# Patient Record
Sex: MALE | Race: WHITE
[De-identification: names, ages, dates, MRNs, and addresses within clinical notes are randomized per-mention and may not be internally consistent; named-entity substitution may affect disease eponyms.]

---

## 2019-07-11 PROBLEM — Z00.00 ENCOUNTER FOR PREVENTIVE HEALTH EXAMINATION: Status: ACTIVE | Noted: 2019-07-11

## 2019-08-08 ENCOUNTER — APPOINTMENT (OUTPATIENT)
Dept: VASCULAR SURGERY | Facility: CLINIC | Age: 69
End: 2019-08-08
Payer: MEDICARE

## 2019-08-08 VITALS
HEART RATE: 67 BPM | BODY MASS INDEX: 25.9 KG/M2 | DIASTOLIC BLOOD PRESSURE: 80 MMHG | WEIGHT: 165 LBS | HEIGHT: 67 IN | TEMPERATURE: 98.7 F | SYSTOLIC BLOOD PRESSURE: 125 MMHG

## 2019-08-08 VITALS — DIASTOLIC BLOOD PRESSURE: 87 MMHG | SYSTOLIC BLOOD PRESSURE: 127 MMHG | HEART RATE: 67 BPM

## 2019-08-08 PROCEDURE — 99203 OFFICE O/P NEW LOW 30 MIN: CPT

## 2019-08-08 PROCEDURE — 93970 EXTREMITY STUDY: CPT

## 2019-08-23 ENCOUNTER — OTHER (OUTPATIENT)
Age: 69
End: 2019-08-23

## 2019-08-23 RX ORDER — SODIUM BICARBONATE 84 MG/ML
8.4 INJECTION, SOLUTION INTRAVENOUS
Qty: 5 | Refills: 0 | Status: COMPLETED | COMMUNITY
Start: 2019-08-23 | End: 2019-08-30

## 2019-08-23 RX ORDER — LIDOCAINE HYDROCHLORIDE 10 MG/ML
1 INJECTION, SOLUTION INFILTRATION; PERINEURAL
Qty: 50 | Refills: 0 | Status: COMPLETED | COMMUNITY
Start: 2019-08-23 | End: 2019-08-30

## 2019-08-23 RX ORDER — SODIUM CHLORIDE 9 G/ML
0.9 INJECTION, SOLUTION INTRAVENOUS
Qty: 500 | Refills: 0 | Status: COMPLETED | COMMUNITY
Start: 2019-08-23 | End: 2019-08-30

## 2019-08-30 ENCOUNTER — APPOINTMENT (OUTPATIENT)
Dept: VASCULAR SURGERY | Facility: CLINIC | Age: 69
End: 2019-08-30
Payer: MEDICARE

## 2019-08-30 ENCOUNTER — OTHER (OUTPATIENT)
Age: 69
End: 2019-08-30

## 2019-08-30 PROCEDURE — 36475 ENDOVENOUS RF 1ST VEIN: CPT | Mod: RT

## 2019-08-30 RX ORDER — LIDOCAINE HYDROCHLORIDE 10 MG/ML
1 INJECTION, SOLUTION INFILTRATION; PERINEURAL
Qty: 50 | Refills: 0 | Status: COMPLETED | COMMUNITY
Start: 2019-08-30 | End: 2019-09-06

## 2019-08-30 RX ORDER — SODIUM CHLORIDE 9 G/ML
0.9 INJECTION, SOLUTION INTRAVENOUS
Qty: 500 | Refills: 0 | Status: COMPLETED | COMMUNITY
Start: 2019-08-30 | End: 2019-09-06

## 2019-08-30 RX ORDER — SODIUM BICARBONATE 84 MG/ML
8.4 INJECTION, SOLUTION INTRAVENOUS
Qty: 5 | Refills: 0 | Status: COMPLETED | COMMUNITY
Start: 2019-08-30 | End: 2019-09-06

## 2019-08-30 NOTE — PROCEDURE
[FreeTextEntry1] : right GSV RFA [FreeTextEntry3] : Procedural safety checklist and time out completed:\par Confirmed patient identification (Patient Name, , and/or medical record number including when possible affirmation by patient or parent/family/other).\par Confirmed procedure with the patient. Consent present, accurate and signed. \par Confirmed special equipment and supplies are present.\par Sterility confirmed. Position verified. \par Site/ side is marked and visible and confirmed. \par Procedure confirmed by consent. Accurate consent including side and site.\par Review of medical records, including venous ultrasound, noting correct procedure including site and side.\par MD/PA verifies presence and review of imaging studies and or written report of imaging studies.\par Agreement on the procedure to be performed\par Time out completed.\par All of the above has been confirmed by the team.\par All patient-specific concerns have been addressed. \par \par Indication: Right lower extremity varicose veins with leg pain, leg swelling, and leg cramping.  Venous insufficiency/ reflux.\par \par Procedure: radiofrequency ablation of the right great saphenous vein. \par 	\par Mr. ALLEN FRANKEL is a 68 year old M with a history of  right lower extremity varicose veins previously seen in the office.  Ultrasound examination demonstrated venous insufficiency. A trial of compression stockings, exercise, elevation, and pain medication was attempted without relief and definitive treatment with radiofrequency ablation was offered. \par \par The patient has come for radiofrequency ablation treatment of the right great saphenous vein. \par I have discussed the risks of the procedure at length with the patient. The risks discussed were inclusive of but not limited to infection, irritation at the site of infiltration of local anesthesia, and also rare risk of deep venous thrombosis and pulmonary emboli. The patient agrees to proceed with the procedure. \par The patient was escorted into the procedure room and a time out called.\par The entire limb was prepped and draped in sterile fashion. The RF fiber was placed on the sterile field and connected by a sterile cable. Actuation, temperature, and impedance testing were performed to ensure that all components were connected and operating properly. \par The patient was placed on the procedure table and local anesthesia was instilled in the skin overlying the access site. Under ultrasound guidance, the vein was punctured with a micropuncture needle, using the anterior wall technique. A guide wire was now introduced through the needle, and the needle was then exchanged over the guide wire for a 7F sheath. The guide wire was removed and the RF probe was then placed into the right great saphenous vein through the sheath and position confirmed using ultrasound guidance. After the RF probe position was verified by ultrasound, tumescent anesthesia consisting of normal saline, 1% lidocaine with 8.4% sodium bicarbonate was infiltrated, under ultrasound guidance, precisely into the perivenous compartment along the entire length of the vein until a “halo” of fluid was noted around the vein. After RF probe position was again confirmed with ultrasound imaging, RF energy was applied. The probe was gradually and carefully withdrawn at a rate of 6.5cm/20seconds. \par \par The total volume injected was 450 cc. \par Total treatment time was 160 seconds.\par Treatment length was 46 cm and 8 cycles of RF performed using the 7 cm probe. \par The probe is 3.7 cm from the SFJ.\par \par Estimated Blood Loss: minimal\par Repeat ultrasound of the treated vein was performed confirming successful treatment. The catheter and sheath were withdrawn and hemostasis established with direct pressure. After assuring hemostasis, a sterile 4x4 was placed on the access site and an ACE compression wrap was applied. Patient tolerated procedure well. Patient was given post-procedure instructions and follow up appointment was scheduled.\par \par \par

## 2019-09-06 ENCOUNTER — APPOINTMENT (OUTPATIENT)
Dept: VASCULAR SURGERY | Facility: CLINIC | Age: 69
End: 2019-09-06
Payer: MEDICARE

## 2019-09-06 ENCOUNTER — APPOINTMENT (OUTPATIENT)
Dept: VASCULAR SURGERY | Facility: CLINIC | Age: 69
End: 2019-09-06

## 2019-09-06 DIAGNOSIS — Z87.891 PERSONAL HISTORY OF NICOTINE DEPENDENCE: ICD-10-CM

## 2019-09-06 DIAGNOSIS — Z80.8 FAMILY HISTORY OF MALIGNANT NEOPLASM OF OTHER ORGANS OR SYSTEMS: ICD-10-CM

## 2019-09-06 DIAGNOSIS — Z87.39 PERSONAL HISTORY OF OTHER DISEASES OF THE MUSCULOSKELETAL SYSTEM AND CONNECTIVE TISSUE: ICD-10-CM

## 2019-09-06 PROCEDURE — 93971 EXTREMITY STUDY: CPT

## 2019-09-06 PROCEDURE — 99212 OFFICE O/P EST SF 10 MIN: CPT

## 2019-09-06 RX ORDER — ALPRAZOLAM 0.5 MG/1
0.5 TABLET ORAL
Qty: 5 | Refills: 0 | Status: DISCONTINUED | COMMUNITY
Start: 2019-08-23 | End: 2019-09-06

## 2019-09-06 NOTE — HISTORY OF PRESENT ILLNESS
[FreeTextEntry1] : Mr. ALLEN FRANKEL is a 68 year who presents to the office for evaluation of his varicose veins and venous insufficiency. Patient presents for radiofrequency ablation of the right small saphenous vein. Patient is s/p radiofrequency ablation of the right great saphenous vein  right great saphenous vein on a week ago 8/30/2019. Patient is doing well without complaints. Patient reports a decrease in leg swelling and foot pain, no fever or chills and no bruising or parasthesias. No SOB or CP.\par

## 2019-09-06 NOTE — DATA REVIEWED
[FreeTextEntry1] : Post procedure venous duplex done today demonstrated successful closure of the right great saphenous vein with and EHIT 2. It was also noted that the right small saphenous vein was thrombosed as well as the tributaries in his right thigh.\par

## 2019-09-06 NOTE — ASSESSMENT
[FreeTextEntry1] : Mr. ALLEN FRANKEL is a 68 year who presents to the office for evaluation of his varicose veins and venous insufficiency. Patient presents for radiofrequency ablation of the right small saphenous vein. Patient is s/p radiofrequency ablation of the right great saphenous vein  right great saphenous vein on a week ago 8/30/2019. Patient is doing well without complaints. Patient reports a decrease in leg swelling and foot pain, no fever or chills and no bruising or parasthesias. No SOB or CP.\par \par Post procedure venous duplex done today demonstrated successful closure of the right great saphenous vein with and EHIT 2. It was also noted that the right small saphenous vein was thrombosed as well as the tributaries in his right thigh.\par \par A/P: Patient with symptomatic varicose veins and venous insufficiency. Patient is s/p R GSV RFA with an EHIT 2. R SSV RFA aborted due to the SSV was found to thrombosed on venous duplex done today. Findings were discussed with the patient and it was noted that the patient has been taking several OTC supplements (more than 5) to "thin his blood". The patient was advised to take Xarelto 15 mg BID for one week and return next week for a repeat venous duplex and possibly to continue with his treatment plan of a left GSV RFA. Patient verbalized an understanding.

## 2019-09-06 NOTE — REVIEW OF SYSTEMS
[Fever] : no fever [Chills] : no chills [Nosebleeds] : no nosebleeds [Limb Pain] : limb pain [Limb Swelling] : limb swelling [Skin Wound] : skin wound [Dry Skin] : dry skin [Difficulty Walking] : no difficulty walking [Easy Bleeding] : no tendency for easy bleeding [Easy Bruising] : no tendency for easy bruising

## 2019-09-06 NOTE — PHYSICAL EXAM
[JVD] : no jugular venous distention  [2+] : left 2+ [Ankle Swelling (On Exam)] : present [] : present [Varicose Veins Of Lower Extremities] : present [Alert] : alert [Skin Ulcer] : ulcer [Oriented to Place] : oriented to place [Oriented to Person] : oriented to person [Calm] : calm [Oriented to Time] : oriented to time [de-identified] : NC/AT [de-identified] : unlabored respirations [de-identified] : well appearing male in NAD [de-identified] : right medial thigh tributary veins are thrombosed with induration and non-tender [de-identified] : reg rhythm [de-identified] : cooperative and pleasant

## 2019-09-09 ENCOUNTER — OTHER (OUTPATIENT)
Age: 69
End: 2019-09-09

## 2019-09-09 RX ORDER — SODIUM CHLORIDE 9 G/ML
0.9 INJECTION, SOLUTION INTRAVENOUS
Qty: 500 | Refills: 0 | Status: COMPLETED | COMMUNITY
Start: 2019-09-09 | End: 2019-09-13

## 2019-09-09 RX ORDER — SODIUM BICARBONATE 84 MG/ML
8.4 INJECTION, SOLUTION INTRAVENOUS
Qty: 5 | Refills: 0 | Status: COMPLETED | COMMUNITY
Start: 2019-09-09 | End: 2019-09-13

## 2019-09-13 ENCOUNTER — APPOINTMENT (OUTPATIENT)
Dept: VASCULAR SURGERY | Facility: CLINIC | Age: 69
End: 2019-09-13
Payer: MEDICARE

## 2019-09-13 PROCEDURE — 93971 EXTREMITY STUDY: CPT | Mod: 59

## 2019-09-13 PROCEDURE — 36475 ENDOVENOUS RF 1ST VEIN: CPT | Mod: LT

## 2019-09-13 NOTE — PROCEDURE
[FreeTextEntry1] : Left GSV RFA [FreeTextEntry3] : Patient doing well s/p radiofrequency ablation of the right great saphenous vein 2019 followed by an EHIT 2 on Xarelto for one week. Right SSV also thrombosed after the right GSV ablation. Venous ultrasound done today demonstrates successful closure of the right great saphenous vein with regression of the EHIT 2 to an EHIT 1. Patient presents for left GSV RFA today.\par  \par Procedural safety checklist and time out completed:\par Confirmed patient identification (Patient Name, , and/or medical record number including when possible affirmation by patient or parent/family/other.\par Confirmed procedure with the patient. Consent present, accurate and signed. \par Confirmed special equipment and supplies are present.\par Sterility confirmed. Position verified. \par Site/ side is marked and visible and confirmed. \par Procedure confirmed by consent. Accurate consent including side and site.\par Review of medical records noting correct procedure including site and side.\par MD/PA verifies presence and review of imaging studies and or written report of imaging studies.\par Specify equipment are available for the planned procedure.\par MD/PA has marked the patient's procedural site and side.\par Agreement on the procedure to be performed\par Time out completed.\par All of the above has been confirmed by the team.\par All patient-specific concerns have been addressed\par \par Indication: Left lower extremity varicose veins with leg pain, leg swelling, and leg cramping.  Venous insufficiency/ reflux.\par \par Procedure: radiofrequency ablation of the left great saphenous vein. \par 	\par [Mr. ALLEN FRANKEL is a 68 year old M with a history of left lower extremity varicose veins previously seen in the office.  Ultrasound examination demonstrated venous insufficiency. A trial of compression stockings, exercise, elevation, and pain medication was attempted without relief and definitive treatment with radiofrequency ablation was offered. \par \par The patient has come for radiofrequency ablation treatment of the left great saphenous vein. \par I have discussed the risks of the procedure at length with the patient. The risks discussed were inclusive of but not limited to infection, irritation at the site of infiltration of local anesthesia, and also rare risk of deep venous thrombosis and pulmonary emboli. The patient agrees to proceed with the procedure. \par The patient was escorted into the procedure room and a time out called.\par The entire limb was prepped and draped in sterile fashion. The RF fiber was placed on the sterile field and connected by a sterile cable. Actuation, temperature, and impedance testing were performed to ensure that all components were connected and operating properly. \par The patient was placed on the procedure table and local anesthesia was instilled in the skin overlying the access site. Under ultrasound guidance, the vein was punctured with a micropuncture needle, using the anterior wall technique. A guide wire was now introduced through the needle, and the needle was then exchanged over the guide wire for a 7F sheath. The guide wire was removed and the RF probe was then placed into the left great saphenous vein through the sheath and position confirmed using ultrasound guidance. After the RF probe position was verified by ultrasound, tumescent anesthesia consisting of normal saline, 1% lidocaine with 8.4% sodium bicarbonate was infiltrated, under ultrasound guidance, precisely into the perivenous compartment along the entire length of the vein until a “halo” of fluid was noted around the vein. After RF probe position was again confirmed with ultrasound imaging, RF energy was applied. The probe was gradually and carefully withdrawn at a rate of 6.5cm/20seconds. \par \par The total volume injected was ___________cc. \par Total treatment time was _____seconds.\par Treatment length was ____ cm and ____cycles of RF performed using the ______ cm probe. \par The probe is ___cm from the SFJ. \par \par Estimated Blood Loss: minimal\par Repeat ultrasound of the treated vein was performed confirming successful treatment. The catheter and sheath were withdrawn and hemostasis established with direct pressure. After assuring hemostasis, a sterile 4x4 was placed on the access site and an ACE compression wrap was applied. Patient tolerated procedure well. Patient was given post-procedure instructions and follow up appointment was scheduled.\par \par

## 2019-09-20 ENCOUNTER — APPOINTMENT (OUTPATIENT)
Dept: VASCULAR SURGERY | Facility: CLINIC | Age: 69
End: 2019-09-20
Payer: MEDICARE

## 2019-09-20 PROCEDURE — 93970 EXTREMITY STUDY: CPT

## 2019-09-27 ENCOUNTER — APPOINTMENT (OUTPATIENT)
Dept: VASCULAR SURGERY | Facility: CLINIC | Age: 69
End: 2019-09-27

## 2019-10-04 ENCOUNTER — OTHER (OUTPATIENT)
Age: 69
End: 2019-10-04

## 2019-10-04 RX ORDER — LIDOCAINE HYDROCHLORIDE 10 MG/ML
1 INJECTION, SOLUTION INFILTRATION; PERINEURAL
Qty: 50 | Refills: 0 | Status: COMPLETED | COMMUNITY
Start: 2019-09-09 | End: 2019-10-04

## 2019-10-04 RX ORDER — SODIUM CHLORIDE 9 G/ML
0.9 INJECTION, SOLUTION INTRAVENOUS
Qty: 500 | Refills: 0 | Status: COMPLETED | COMMUNITY
Start: 2019-10-04 | End: 2019-10-11

## 2019-10-04 RX ORDER — LIDOCAINE HYDROCHLORIDE 10 MG/ML
1 INJECTION, SOLUTION INFILTRATION; PERINEURAL
Qty: 50 | Refills: 0 | Status: COMPLETED | COMMUNITY
Start: 2019-10-04 | End: 2019-10-11

## 2019-10-04 RX ORDER — SODIUM BICARBONATE 84 MG/ML
8.4 INJECTION, SOLUTION INTRAVENOUS
Qty: 5 | Refills: 0 | Status: COMPLETED | COMMUNITY
Start: 2019-10-04 | End: 2019-10-11

## 2019-10-11 ENCOUNTER — APPOINTMENT (OUTPATIENT)
Dept: VASCULAR SURGERY | Facility: CLINIC | Age: 69
End: 2019-10-11
Payer: MEDICARE

## 2019-10-11 PROCEDURE — 93971 EXTREMITY STUDY: CPT

## 2019-10-11 PROCEDURE — 37765 STAB PHLEB VEINS XTR 10-20: CPT | Mod: RT

## 2019-10-11 RX ORDER — RIVAROXABAN 15 MG-20MG
15 & 20 KIT ORAL
Qty: 1 | Refills: 0 | Status: COMPLETED | COMMUNITY
Start: 2019-09-06 | End: 2019-10-11

## 2019-10-11 NOTE — PROCEDURE
[FreeTextEntry1] : right stab phlebectomy [FreeTextEntry3] : Patient presents today for radiofrequency ablation of his left small saphenous vein. However, on pre-procedure venous ultrasound it was noted that the left SSV thrombosed spontaneously and there was no DVT. Therefore the ablation procedure was aborted and we proceeded to perform a right stab phlebectomy.\par \par Procedural safety checklist and time out completed:\par Confirmed patient identification (Patient Name, , and/or medical record number including when possible affirmation by patient or parent/family/other.\par Confirmed procedure with the patient. Consent present, accurate and signed. \par Confirmed special equipment and supplies are present.\par Sterility confirmed. Position verified. \par Site/ side is marked and visible and confirmed. \par Procedure confirmed by consent. Accurate consent including side and site.\par Review of medical records noting correct procedure including site and side.\par MD/PA verifies presence and review of imaging studies and or written report of imaging studies.\par Specify equipment are available for the planned procedure.\par MD/PA has marked the patient's procedural site and side.\par Agreement on the procedure to be performed\par Time out completed.\par All of the above has been confirmed by the team.\par All patient-specific concerns have been addressed. \par \par Indication: right lower extremity varicose veins with inflammation, leg pain, leg swelling, and leg cramping.  \par \par Procedure: Stab phlebectomy right lower extremity\par \par  Mr. ALLEN FRANKEL is a 68 year old M with a history of symptomatic right lower extremity varicose veins. A trial of compression stockings, exercise, elevation, and pain medication was attempted without relief and definitive treatment with microphlebectomy was offered. \par \par I have discussed the risks of the procedure at length with the patient. The risks discussed were inclusive of but not limited to infection, irritation at the site of infiltration of local anesthesia, and also rare risk of deep venous thrombosis and pulmonary emboli. The patient agrees to proceed with the procedure. \par The patient was escorted into the procedure room, the varicose veins for treatment were marked out and the patient placed on the examination table. The entire limb was prepped and draped in sterile fashion and a  time out was called. \par \par Local anesthesia using 30 cc 1% lidocaine with epinephrine was infiltrated using a 25 gauge needle over the previously marked prominent varicose vein sites.\par Multiple small stab incisions, each less than 1 cm in length was made at the noted sites. With the help of a vein hook, the vein was fished out at each of these sites, rolled over a narrow-tipped mosquito clamp and removed. Hemostasis was secured with leg elevation and application of manual pressure. After assuring hemostasis, a sterile 4x4 was placed on the access sites and an ACE compression wrap was applied. Estimated blood loss: minimal. Patient tolerated procedure well. Patient was given post-procedure instructions and follow up appointment was scheduled. \par \par SIDE - RIGHT\par SITE - CALF / THIGH\par LOCATION - PROXIMAL / DISTAL / MEDIAL / ANTERIOR \par \par Total Stab Incisions 10-20\par \par

## 2019-10-14 ENCOUNTER — APPOINTMENT (OUTPATIENT)
Dept: VASCULAR SURGERY | Facility: CLINIC | Age: 69
End: 2019-10-14

## 2019-11-01 ENCOUNTER — APPOINTMENT (OUTPATIENT)
Dept: VASCULAR SURGERY | Facility: CLINIC | Age: 69
End: 2019-11-01

## 2019-11-08 ENCOUNTER — APPOINTMENT (OUTPATIENT)
Dept: VASCULAR SURGERY | Facility: CLINIC | Age: 69
End: 2019-11-08
Payer: MEDICARE

## 2019-11-08 VITALS — HEART RATE: 54 BPM | SYSTOLIC BLOOD PRESSURE: 124 MMHG | DIASTOLIC BLOOD PRESSURE: 82 MMHG

## 2019-11-08 VITALS
WEIGHT: 165 LBS | TEMPERATURE: 98.4 F | SYSTOLIC BLOOD PRESSURE: 126 MMHG | HEIGHT: 67 IN | HEART RATE: 55 BPM | BODY MASS INDEX: 25.9 KG/M2 | DIASTOLIC BLOOD PRESSURE: 85 MMHG

## 2019-11-08 PROCEDURE — 99024 POSTOP FOLLOW-UP VISIT: CPT

## 2019-11-08 NOTE — REVIEW OF SYSTEMS
[Chest Pain] : no chest pain [Leg Claudication] : no intermittent leg claudication [Lower Ext Edema] : no extremity edema [Shortness Of Breath] : no shortness of breath [Abdominal Pain] : no abdominal pain [Skin Wound] : no skin wound [Difficulty Walking] : no difficulty walking [Limb Weakness] : no limb weakness [Negative] : Constitutional [FreeTextEntry9] : R foot numbness

## 2019-11-08 NOTE — ASSESSMENT
[FreeTextEntry1] : ALLEN FRANKEL is a 68 year male who presents today for follow up visit s/p bilateral GSV ablations and RLE stab phlebectomy. Of note, he was found to have EHIT 2 s/p R GSV RFA (performed 8/30/19) - the R SSV was noted to be thrombosed as well. R SSV ablation was canceled due to this finding. Patient was treated with Xarelto. Subsequent VLEs have been negative for DVT and show that bilateral GSVs have been successfully ablated. \par \par Today the patient reports an improvement in his symptoms including pain, discomfort, swelling, and discoloration. Denies fever, chills, claudication, rest pain and tissue loss. \par \par On exam, legs are warm and well perfused. Stab phlebectomy and RFA incisions sites are well healed. Bilateral LE hyperpigmentation and dry skin noted.\par \par Patient is s/p RFA and stab phlebectomy procedures c/b EHIT and successfully treated with short course of oral anticoagulation. Now reports symptomatic improvement but concerned about R foot numbness. Recommend neurology/spine referral. Otherwise follow up as needed.

## 2019-11-08 NOTE — PHYSICAL EXAM
[No Rash or Lesion] : No rash or lesion [2+] : left 2+ [Skin Ulcer] : no ulcer [Alert] : alert [Oriented to Person] : oriented to person [Oriented to Place] : oriented to place [Oriented to Time] : oriented to time [de-identified] : well appearing, NAD [Calm] : calm [de-identified] : unlabored [FreeTextEntry1] : well healed RLE stab phlebectomy and bilateral RFA incisions\par bilateral LE hyperpigmentation R>L\par dry flaky skin

## 2019-11-08 NOTE — HISTORY OF PRESENT ILLNESS
[FreeTextEntry1] : ALLEN FRANKEL is a 68 year male who presents today for follow up visit s/p bilateral GSV ablations and RLE stab phlebectomy. Of note, he was found to have EHIT 2 s/p R GSV RFA (performed 8/30/19) - the R SSV was noted to be thrombosed as well. R SSV ablation was canceled due to this finding. Patient was treated with Xarelto. Subsequent VLEs have been negative for DVT and show that bilateral GSVs have been successfully ablated. \par \par Today the patient reports an improvement in his symptoms including pain, discomfort, swelling, and discoloration. Denies fever, chills, claudication, rest pain and tissue loss.

## 2020-02-14 ENCOUNTER — APPOINTMENT (OUTPATIENT)
Dept: VASCULAR SURGERY | Facility: CLINIC | Age: 70
End: 2020-02-14
Payer: MEDICARE

## 2020-02-14 VITALS — SYSTOLIC BLOOD PRESSURE: 128 MMHG | HEART RATE: 64 BPM | DIASTOLIC BLOOD PRESSURE: 86 MMHG

## 2020-02-14 VITALS
BODY MASS INDEX: 25.9 KG/M2 | HEART RATE: 65 BPM | DIASTOLIC BLOOD PRESSURE: 85 MMHG | TEMPERATURE: 98.4 F | SYSTOLIC BLOOD PRESSURE: 129 MMHG | WEIGHT: 165 LBS | HEIGHT: 67 IN

## 2020-02-14 PROCEDURE — 99212 OFFICE O/P EST SF 10 MIN: CPT

## 2020-02-19 NOTE — REVIEW OF SYSTEMS
[Chest Pain] : no chest pain [Leg Claudication] : no intermittent leg claudication [Shortness Of Breath] : no shortness of breath [Abdominal Pain] : no abdominal pain [Skin Lesions] : no skin lesions [Skin Wound] : no skin wound [Negative] : Constitutional [FreeTextEntry9] : bilateral LE weakness/tiredness [de-identified] : R plantar numbness

## 2020-02-19 NOTE — ASSESSMENT
[FreeTextEntry1] : ALLEN FRANKEL is a 69 year old male who presents today for leg discomfort. Reports having weakness and tiredness in bilateral LE (worse on the R) since endovenous procedures in 2019. Continues to experience R plantar numbness, however has not been evaluated by neurology/spine specialists. LE discomfort worse in the morning and with prolonged sitting. Otherwise denies claudication, rest pain, tissue loss, walking difficulty. \par \par He is s/p R GSV RFA (8/2019), L GSV RFA (9/2019), RLE stab phlebectomy (10/2019). R GSV RFA complicated by EHIT 2 and treated with Xarelto. Subsequent VLEs negative for acute DVT and demonstrated successful closure of bilateral GSVs. \par \par On exam, legs are warm and well perfused. Normal LE pulses exam. Well healed RLE stab phlebectomy incisions. Bilateral LE venous stasis changes worse on the right. Equal LE strength bilaterally. \par \par Unlikely symptoms vasculogenic or related to venous procedures. Patient again advised to follow up with spine specialist and neurology for radiculopathy/neuropathy. Vascular follow up as needed.

## 2020-02-19 NOTE — HISTORY OF PRESENT ILLNESS
[FreeTextEntry1] : ALLEN FRANKEL is a 69 year old male who presents today for leg discomfort. Reports having weakness and tiredness in bilateral LE (worse on the R) since endovenous procedures in 2019. Continues to experience R plantar numbness, however has not been evaluated by neurology/spine specialists. LE discomfort worse in the morning and with prolonged sitting. Otherwise denies claudication, rest pain, tissue loss, walking difficulty. \par \par He is s/p R GSV RFA (8/2019), L GSV RFA (9/2019), RLE stab phlebectomy (10/2019). R GSV RFA complicated by EHIT 2 and treated with Xarelto. Subsequent VLEs negative for acute DVT and demonstrated successful closure of bilateral GSVs.

## 2020-02-19 NOTE — PHYSICAL EXAM
[2+] : left 2+ [No Rash or Lesion] : No rash or lesion [Skin Ulcer] : no ulcer [Alert] : alert [Oriented to Person] : oriented to person [Oriented to Place] : oriented to place [Oriented to Time] : oriented to time [Calm] : calm [de-identified] : well appearing, NAD [FreeTextEntry1] : well healed RLE stab phlebectomy \par bilateral LE hyperpigmentation R>L\par dry flaky skin [de-identified] : unlabored [de-identified] : equal LE muscle strength

## 2022-05-12 ENCOUNTER — APPOINTMENT (OUTPATIENT)
Dept: VASCULAR SURGERY | Facility: CLINIC | Age: 72
End: 2022-05-12
Payer: MEDICARE

## 2022-05-12 VITALS
DIASTOLIC BLOOD PRESSURE: 81 MMHG | WEIGHT: 168 LBS | TEMPERATURE: 95.7 F | HEART RATE: 66 BPM | SYSTOLIC BLOOD PRESSURE: 118 MMHG | BODY MASS INDEX: 26.37 KG/M2 | HEIGHT: 67 IN

## 2022-05-12 VITALS — DIASTOLIC BLOOD PRESSURE: 78 MMHG | SYSTOLIC BLOOD PRESSURE: 114 MMHG | HEART RATE: 66 BPM

## 2022-05-12 PROCEDURE — 93971 EXTREMITY STUDY: CPT

## 2022-05-12 PROCEDURE — 99213 OFFICE O/P EST LOW 20 MIN: CPT

## 2022-05-16 NOTE — ASSESSMENT
[FreeTextEntry1] : A/P: \par \par Patient with longstanding history of symptomatic venous insufficiency. He is s/p multiple vein procedures and now presents with recurrence of painful RLE varicose veins. Symptoms persist despite conservative measures. Treatment is indicated for symptomatic relief. \par \par Recommend RLE venasure ablation for refluxing segment of the GSV in the calf. He will likely need RLE stab phlebectomy to follow ablation procedure. R/B/A discussed with the patient. All questions/concerns addressed. Patient relayed understanding. He wants to discuss treatment plan with his podiatrist Dr. Aleman first and then will let us know whether or not he wants to proceed with proposed vein procedures.

## 2022-05-16 NOTE — PHYSICAL EXAM
[2+] : left 2+ [Alert] : alert [Oriented to Person] : oriented to person [Oriented to Place] : oriented to place [Oriented to Time] : oriented to time [Calm] : calm [Skin Ulcer] : no ulcer [de-identified] : well appearing, NAD [de-identified] : unlabored [FreeTextEntry1] : LE vein findings: \par Varicosities (spider, reticular, varicose)  bilateral but RLE >>>LLE \par Edema scant RLE \par Skin changes - bilateral dry, flaky skin, stasis dermatitis, hyperpigmentation in the gator area, \par Ulcer - none\par Palpable DP pulses bilaterally\par

## 2022-05-16 NOTE — HISTORY OF PRESENT ILLNESS
[FreeTextEntry1] : ALLEN FRANKEL is a 71 year old male who presents today for varicose veins. He is s/p multiple vein procedures in the past including bilateral GSV RFA (2019) and RLE stab phlebectomy. R GSV RFA c/b EHIT which was successfully treated with short course of Xarelto.\par \par Today patient complains of recurrence of RLE varicose veins in the thighs and calves. Symptoms associated with swelling and discomfort. Symptoms persist despite CS and leg elevation. Patient also uses OTC supplementation for "vascular health." Despite these measures, he is does not appreciate any improvement in his symptoms. \par \par Of note, patient has h/o R ankle venous stasis ulcer from 3 years ago.  No recurrence since then. \par \par RLE VLE today is negative for DVT. Deep reflux present. GSV not visualized in the thigh. Calf GSV noted with reflux.

## 2022-06-23 ENCOUNTER — APPOINTMENT (OUTPATIENT)
Dept: VASCULAR SURGERY | Facility: CLINIC | Age: 72
End: 2022-06-23

## 2022-06-23 VITALS
HEIGHT: 67 IN | SYSTOLIC BLOOD PRESSURE: 123 MMHG | BODY MASS INDEX: 27.47 KG/M2 | DIASTOLIC BLOOD PRESSURE: 82 MMHG | TEMPERATURE: 95.5 F | WEIGHT: 175 LBS | HEART RATE: 69 BPM

## 2022-06-23 VITALS — SYSTOLIC BLOOD PRESSURE: 134 MMHG | DIASTOLIC BLOOD PRESSURE: 84 MMHG | HEART RATE: 66 BPM

## 2022-06-23 DIAGNOSIS — I83.893 VARICOSE VEINS OF BILATERAL LOWER EXTREMITIES WITH OTHER COMPLICATIONS: ICD-10-CM

## 2022-06-23 PROCEDURE — 99212 OFFICE O/P EST SF 10 MIN: CPT

## 2022-06-27 PROBLEM — I83.893 SYMPTOMATIC VARICOSE VEINS OF BOTH LOWER EXTREMITIES: Status: ACTIVE | Noted: 2019-08-08

## 2022-06-27 NOTE — HISTORY OF PRESENT ILLNESS
[FreeTextEntry1] : ALLEN FRANKEL is a 71 year old male who presents today to discuss vein procedures. He states that his symptoms are unchanged and at most intermittently bothersome. He continues to wear compression stockings. He adheres to a self-prescribed regimen of daily of pycogenol (herbal supplement) for DVT prevention. \par \par \par \par

## 2022-06-27 NOTE — ASSESSMENT
[FreeTextEntry1] : A/P:\par \par Patient with longstanding history of venous insufficiency. Has residual symptoms despite compression stocking use of >3 months. We discussed options to continue with conservative measures versus pursuing vein procedures. Patient reports that symptoms are now manageable with compression garments. He wants to hold off on any procedures at this time. He will call our office if symptoms worsen.

## 2022-06-27 NOTE — PHYSICAL EXAM
[2+] : left 2+ [Skin Ulcer] : no ulcer [Alert] : alert [Oriented to Person] : oriented to person [Oriented to Place] : oriented to place [Oriented to Time] : oriented to time [Calm] : calm [de-identified] : well appearing, NAD [de-identified] : unlabored [FreeTextEntry1] : LE vein findings: \par Varicosities (spider, reticular, varicose)  bilateral but RLE >>>LLE \par Edema scant RLE \par Skin changes - bilateral dry, flaky skin, stasis dermatitis, hyperpigmentation in the gator area, \par Ulcer - none\par Palpable DP pulses bilaterally\par